# Patient Record
Sex: FEMALE | Race: WHITE | Employment: FULL TIME | URBAN - METROPOLITAN AREA
[De-identification: names, ages, dates, MRNs, and addresses within clinical notes are randomized per-mention and may not be internally consistent; named-entity substitution may affect disease eponyms.]

---

## 2024-08-03 ENCOUNTER — APPOINTMENT (OUTPATIENT)
Dept: GENERAL RADIOLOGY | Age: 34
DRG: 282 | End: 2024-08-03
Payer: COMMERCIAL

## 2024-08-03 ENCOUNTER — HOSPITAL ENCOUNTER (INPATIENT)
Age: 34
LOS: 1 days | Discharge: ANOTHER HOSPITAL | DRG: 282 | End: 2024-08-04
Attending: EMERGENCY MEDICINE | Admitting: FAMILY MEDICINE
Payer: COMMERCIAL

## 2024-08-03 DIAGNOSIS — I21.4 NSTEMI (NON-ST ELEVATED MYOCARDIAL INFARCTION) (HCC): Primary | ICD-10-CM

## 2024-08-03 DIAGNOSIS — R07.9 CHEST PAIN, UNSPECIFIED TYPE: ICD-10-CM

## 2024-08-03 LAB
ALBUMIN SERPL-MCNC: 4.4 G/DL (ref 3.5–5.2)
ALP SERPL-CCNC: 120 U/L (ref 35–104)
ALT SERPL-CCNC: 27 U/L (ref 0–32)
ANION GAP SERPL CALCULATED.3IONS-SCNC: 9 MMOL/L (ref 7–16)
AST SERPL-CCNC: 22 U/L (ref 0–31)
BASOPHILS # BLD: 0.05 K/UL (ref 0–0.2)
BASOPHILS NFR BLD: 1 % (ref 0–2)
BILIRUB SERPL-MCNC: 0.2 MG/DL (ref 0–1.2)
BUN SERPL-MCNC: 16 MG/DL (ref 6–20)
CALCIUM SERPL-MCNC: 9.6 MG/DL (ref 8.6–10.2)
CHLORIDE SERPL-SCNC: 104 MMOL/L (ref 98–107)
CO2 SERPL-SCNC: 26 MMOL/L (ref 22–29)
CREAT SERPL-MCNC: 0.6 MG/DL (ref 0.5–1)
D-DIMER QUANTITATIVE: <200 NG/ML DDU (ref 0–230)
EOSINOPHIL # BLD: 0.2 K/UL (ref 0.05–0.5)
EOSINOPHILS RELATIVE PERCENT: 2 % (ref 0–6)
ERYTHROCYTE [DISTWIDTH] IN BLOOD BY AUTOMATED COUNT: 11.9 % (ref 11.5–15)
GFR, ESTIMATED: >90 ML/MIN/1.73M2
GLUCOSE SERPL-MCNC: 102 MG/DL (ref 74–99)
HCG, URINE, POC: NEGATIVE
HCT VFR BLD AUTO: 42.1 % (ref 34–48)
HGB BLD-MCNC: 14.8 G/DL (ref 11.5–15.5)
IMM GRANULOCYTES # BLD AUTO: 0.03 K/UL (ref 0–0.58)
IMM GRANULOCYTES NFR BLD: 0 % (ref 0–5)
LYMPHOCYTES NFR BLD: 2.26 K/UL (ref 1.5–4)
LYMPHOCYTES RELATIVE PERCENT: 21 % (ref 20–42)
Lab: NORMAL
MCH RBC QN AUTO: 32.4 PG (ref 26–35)
MCHC RBC AUTO-ENTMCNC: 35.2 G/DL (ref 32–34.5)
MCV RBC AUTO: 92.1 FL (ref 80–99.9)
MONOCYTES NFR BLD: 0.7 K/UL (ref 0.1–0.95)
MONOCYTES NFR BLD: 7 % (ref 2–12)
NEGATIVE QC PASS/FAIL: NORMAL
NEUTROPHILS NFR BLD: 70 % (ref 43–80)
NEUTS SEG NFR BLD: 7.44 K/UL (ref 1.8–7.3)
PLATELET # BLD AUTO: 242 K/UL (ref 130–450)
PMV BLD AUTO: 10.1 FL (ref 7–12)
POSITIVE QC PASS/FAIL: NORMAL
POTASSIUM SERPL-SCNC: 4.6 MMOL/L (ref 3.5–5)
PROT SERPL-MCNC: 7.7 G/DL (ref 6.4–8.3)
RBC # BLD AUTO: 4.57 M/UL (ref 3.5–5.5)
SODIUM SERPL-SCNC: 139 MMOL/L (ref 132–146)
TROPONIN I SERPL HS-MCNC: 103 NG/L (ref 0–9)
TROPONIN I SERPL HS-MCNC: 44 NG/L (ref 0–9)
WBC OTHER # BLD: 10.7 K/UL (ref 4.5–11.5)

## 2024-08-03 PROCEDURE — 85379 FIBRIN DEGRADATION QUANT: CPT

## 2024-08-03 PROCEDURE — 71046 X-RAY EXAM CHEST 2 VIEWS: CPT

## 2024-08-03 PROCEDURE — 85025 COMPLETE CBC W/AUTO DIFF WBC: CPT

## 2024-08-03 PROCEDURE — 6360000002 HC RX W HCPCS: Performed by: EMERGENCY MEDICINE

## 2024-08-03 PROCEDURE — 96374 THER/PROPH/DIAG INJ IV PUSH: CPT

## 2024-08-03 PROCEDURE — 99285 EMERGENCY DEPT VISIT HI MDM: CPT

## 2024-08-03 PROCEDURE — 85730 THROMBOPLASTIN TIME PARTIAL: CPT

## 2024-08-03 PROCEDURE — 80053 COMPREHEN METABOLIC PANEL: CPT

## 2024-08-03 PROCEDURE — 6370000000 HC RX 637 (ALT 250 FOR IP): Performed by: EMERGENCY MEDICINE

## 2024-08-03 PROCEDURE — 84484 ASSAY OF TROPONIN QUANT: CPT

## 2024-08-03 PROCEDURE — 93005 ELECTROCARDIOGRAM TRACING: CPT

## 2024-08-03 RX ORDER — HEPARIN SODIUM 10000 [USP'U]/100ML
5-30 INJECTION, SOLUTION INTRAVENOUS CONTINUOUS
Status: DISCONTINUED | OUTPATIENT
Start: 2024-08-03 | End: 2024-08-05 | Stop reason: HOSPADM

## 2024-08-03 RX ORDER — ASPIRIN 81 MG/1
324 TABLET, CHEWABLE ORAL ONCE
Status: COMPLETED | OUTPATIENT
Start: 2024-08-03 | End: 2024-08-03

## 2024-08-03 RX ORDER — HEPARIN SODIUM 1000 [USP'U]/ML
4000 INJECTION, SOLUTION INTRAVENOUS; SUBCUTANEOUS ONCE
Status: COMPLETED | OUTPATIENT
Start: 2024-08-03 | End: 2024-08-03

## 2024-08-03 RX ORDER — HEPARIN SODIUM 1000 [USP'U]/ML
2000 INJECTION, SOLUTION INTRAVENOUS; SUBCUTANEOUS PRN
Status: DISCONTINUED | OUTPATIENT
Start: 2024-08-03 | End: 2024-08-05 | Stop reason: HOSPADM

## 2024-08-03 RX ORDER — HEPARIN SODIUM 1000 [USP'U]/ML
4000 INJECTION, SOLUTION INTRAVENOUS; SUBCUTANEOUS PRN
Status: DISCONTINUED | OUTPATIENT
Start: 2024-08-03 | End: 2024-08-05 | Stop reason: HOSPADM

## 2024-08-03 RX ADMIN — HEPARIN SODIUM 4000 UNITS: 1000 INJECTION INTRAVENOUS; SUBCUTANEOUS at 23:48

## 2024-08-03 RX ADMIN — HEPARIN SODIUM AND DEXTROSE 12 UNITS/KG/HR: 10000; 5 INJECTION INTRAVENOUS at 23:49

## 2024-08-03 RX ADMIN — ASPIRIN 81 MG CHEWABLE TABLET 324 MG: 81 TABLET CHEWABLE at 22:31

## 2024-08-04 ENCOUNTER — APPOINTMENT (OUTPATIENT)
Dept: CT IMAGING | Age: 34
DRG: 282 | End: 2024-08-04
Payer: COMMERCIAL

## 2024-08-04 VITALS
WEIGHT: 195.6 LBS | OXYGEN SATURATION: 97 % | SYSTOLIC BLOOD PRESSURE: 145 MMHG | BODY MASS INDEX: 33.39 KG/M2 | DIASTOLIC BLOOD PRESSURE: 95 MMHG | TEMPERATURE: 98.1 F | HEART RATE: 80 BPM | RESPIRATION RATE: 18 BRPM | HEIGHT: 64 IN

## 2024-08-04 PROBLEM — F32.A DEPRESSION: Status: ACTIVE | Noted: 2024-08-04

## 2024-08-04 PROBLEM — I21.4 NSTEMI (NON-ST ELEVATED MYOCARDIAL INFARCTION) (HCC): Status: ACTIVE | Noted: 2024-08-04

## 2024-08-04 PROBLEM — T43.625A AMPHETAMINE ADVERSE REACTION: Status: ACTIVE | Noted: 2024-08-04

## 2024-08-04 PROBLEM — G43.901 MIGRAINE WITH STATUS MIGRAINOSUS, NOT INTRACTABLE: Status: ACTIVE | Noted: 2024-08-04

## 2024-08-04 PROBLEM — J45.20 MILD INTERMITTENT ASTHMA WITHOUT COMPLICATION: Status: ACTIVE | Noted: 2024-08-04

## 2024-08-04 PROBLEM — F90.9 ADHD: Status: ACTIVE | Noted: 2024-08-04

## 2024-08-04 PROBLEM — F15.90 AMPHETAMINE USE: Status: ACTIVE | Noted: 2024-08-04

## 2024-08-04 LAB
AMPHET UR QL SCN: POSITIVE
BARBITURATES UR QL SCN: NEGATIVE
BENZODIAZ UR QL: NEGATIVE
BUPRENORPHINE UR QL: NEGATIVE
CANNABINOIDS UR QL SCN: NEGATIVE
CHOLEST SERPL-MCNC: 192 MG/DL
COCAINE UR QL SCN: NEGATIVE
EKG ATRIAL RATE: 70 BPM
EKG P AXIS: 26 DEGREES
EKG P-R INTERVAL: 138 MS
EKG Q-T INTERVAL: 374 MS
EKG QRS DURATION: 68 MS
EKG QTC CALCULATION (BAZETT): 403 MS
EKG R AXIS: 8 DEGREES
EKG T AXIS: 51 DEGREES
EKG VENTRICULAR RATE: 70 BPM
FENTANYL UR QL: NEGATIVE
HDLC SERPL-MCNC: 56 MG/DL
LDLC SERPL CALC-MCNC: 99 MG/DL
METHADONE UR QL: NEGATIVE
OPIATES UR QL SCN: NEGATIVE
OXYCODONE UR QL SCN: NEGATIVE
PARTIAL THROMBOPLASTIN TIME: 32 SEC (ref 24.5–35.1)
PARTIAL THROMBOPLASTIN TIME: 61.2 SEC (ref 24.5–35.1)
PARTIAL THROMBOPLASTIN TIME: 72.8 SEC (ref 24.5–35.1)
PARTIAL THROMBOPLASTIN TIME: 84.4 SEC (ref 24.5–35.1)
PCP UR QL SCN: NEGATIVE
TEST INFORMATION: ABNORMAL
TRIGL SERPL-MCNC: 183 MG/DL
TROPONIN I SERPL HS-MCNC: 220 NG/L (ref 0–9)
TROPONIN I SERPL HS-MCNC: 428 NG/L (ref 0–9)
TROPONIN I SERPL HS-MCNC: 995 NG/L (ref 0–9)
TSH SERPL DL<=0.05 MIU/L-ACNC: 1.69 UIU/ML (ref 0.27–4.2)
VLDLC SERPL CALC-MCNC: 37 MG/DL

## 2024-08-04 PROCEDURE — 99222 1ST HOSP IP/OBS MODERATE 55: CPT | Performed by: FAMILY MEDICINE

## 2024-08-04 PROCEDURE — 80061 LIPID PANEL: CPT

## 2024-08-04 PROCEDURE — 6360000004 HC RX CONTRAST MEDICATION: Performed by: RADIOLOGY

## 2024-08-04 PROCEDURE — 2060000000 HC ICU INTERMEDIATE R&B

## 2024-08-04 PROCEDURE — 99223 1ST HOSP IP/OBS HIGH 75: CPT | Performed by: INTERNAL MEDICINE

## 2024-08-04 PROCEDURE — 84443 ASSAY THYROID STIM HORMONE: CPT

## 2024-08-04 PROCEDURE — 71275 CT ANGIOGRAPHY CHEST: CPT

## 2024-08-04 PROCEDURE — 6370000000 HC RX 637 (ALT 250 FOR IP): Performed by: FAMILY MEDICINE

## 2024-08-04 PROCEDURE — 6360000002 HC RX W HCPCS: Performed by: EMERGENCY MEDICINE

## 2024-08-04 PROCEDURE — 93005 ELECTROCARDIOGRAM TRACING: CPT | Performed by: NURSE PRACTITIONER

## 2024-08-04 PROCEDURE — 80307 DRUG TEST PRSMV CHEM ANLYZR: CPT

## 2024-08-04 PROCEDURE — 85730 THROMBOPLASTIN TIME PARTIAL: CPT

## 2024-08-04 PROCEDURE — 2580000003 HC RX 258: Performed by: FAMILY MEDICINE

## 2024-08-04 PROCEDURE — 36415 COLL VENOUS BLD VENIPUNCTURE: CPT

## 2024-08-04 PROCEDURE — 93010 ELECTROCARDIOGRAM REPORT: CPT | Performed by: INTERNAL MEDICINE

## 2024-08-04 PROCEDURE — 84484 ASSAY OF TROPONIN QUANT: CPT

## 2024-08-04 RX ORDER — ALBUTEROL SULFATE 90 UG/1
2 AEROSOL, METERED RESPIRATORY (INHALATION) EVERY 6 HOURS PRN
COMMUNITY

## 2024-08-04 RX ORDER — DEXTROAMPHETAMINE SACCHARATE, AMPHETAMINE ASPARTATE MONOHYDRATE, DEXTROAMPHETAMINE SULFATE AND AMPHETAMINE SULFATE 6.25; 6.25; 6.25; 6.25 MG/1; MG/1; MG/1; MG/1
25 CAPSULE, EXTENDED RELEASE ORAL EVERY MORNING
COMMUNITY
End: 2024-08-04 | Stop reason: HOSPADM

## 2024-08-04 RX ORDER — IBUPROFEN 200 MG
400-800 TABLET ORAL EVERY 6 HOURS PRN
COMMUNITY

## 2024-08-04 RX ORDER — SUMATRIPTAN 25 MG/1
25 TABLET, FILM COATED ORAL
COMMUNITY
End: 2024-08-04 | Stop reason: HOSPADM

## 2024-08-04 RX ORDER — ALBUTEROL SULFATE 0.63 MG/3ML
1 SOLUTION RESPIRATORY (INHALATION) EVERY 6 HOURS PRN
COMMUNITY
End: 2024-08-04 | Stop reason: ALTCHOICE

## 2024-08-04 RX ORDER — DEXTROAMPHETAMINE SACCHARATE, AMPHETAMINE ASPARTATE MONOHYDRATE, DEXTROAMPHETAMINE SULFATE AND AMPHETAMINE SULFATE 6.25; 6.25; 6.25; 6.25 MG/1; MG/1; MG/1; MG/1
25 CAPSULE, EXTENDED RELEASE ORAL EVERY MORNING
COMMUNITY

## 2024-08-04 RX ORDER — ASPIRIN 81 MG/1
81 TABLET, CHEWABLE ORAL DAILY
Status: DISCONTINUED | OUTPATIENT
Start: 2024-08-05 | End: 2024-08-05 | Stop reason: HOSPADM

## 2024-08-04 RX ORDER — ACETAMINOPHEN 325 MG/1
650 TABLET ORAL EVERY 6 HOURS PRN
Status: DISCONTINUED | OUTPATIENT
Start: 2024-08-04 | End: 2024-08-05 | Stop reason: HOSPADM

## 2024-08-04 RX ORDER — ACETAMINOPHEN 650 MG/1
650 SUPPOSITORY RECTAL EVERY 6 HOURS PRN
Status: DISCONTINUED | OUTPATIENT
Start: 2024-08-04 | End: 2024-08-05 | Stop reason: HOSPADM

## 2024-08-04 RX ORDER — SUMATRIPTAN 25 MG/1
25 TABLET, FILM COATED ORAL
COMMUNITY

## 2024-08-04 RX ORDER — M-VIT,TX,IRON,MINS/CALC/FOLIC 27MG-0.4MG
1 TABLET ORAL DAILY
COMMUNITY

## 2024-08-04 RX ORDER — NITROGLYCERIN 0.4 MG/1
0.4 TABLET SUBLINGUAL EVERY 5 MIN PRN
Status: DISCONTINUED | OUTPATIENT
Start: 2024-08-04 | End: 2024-08-05 | Stop reason: HOSPADM

## 2024-08-04 RX ORDER — SODIUM CHLORIDE 0.9 % (FLUSH) 0.9 %
5-40 SYRINGE (ML) INJECTION EVERY 12 HOURS SCHEDULED
Status: DISCONTINUED | OUTPATIENT
Start: 2024-08-04 | End: 2024-08-05 | Stop reason: HOSPADM

## 2024-08-04 RX ORDER — ESCITALOPRAM OXALATE 5 MG/1
5 TABLET ORAL DAILY
COMMUNITY

## 2024-08-04 RX ORDER — ATORVASTATIN CALCIUM 40 MG/1
80 TABLET, FILM COATED ORAL NIGHTLY
Status: DISCONTINUED | OUTPATIENT
Start: 2024-08-04 | End: 2024-08-05 | Stop reason: HOSPADM

## 2024-08-04 RX ORDER — PROPRANOLOL HYDROCHLORIDE 20 MG/1
20 TABLET ORAL 2 TIMES DAILY
COMMUNITY

## 2024-08-04 RX ORDER — ALBUTEROL SULFATE 2.5 MG/3ML
2.5 SOLUTION RESPIRATORY (INHALATION) EVERY 4 HOURS PRN
Status: DISCONTINUED | OUTPATIENT
Start: 2024-08-04 | End: 2024-08-05 | Stop reason: HOSPADM

## 2024-08-04 RX ORDER — SODIUM CHLORIDE 0.9 % (FLUSH) 0.9 %
5-40 SYRINGE (ML) INJECTION PRN
Status: DISCONTINUED | OUTPATIENT
Start: 2024-08-04 | End: 2024-08-05 | Stop reason: HOSPADM

## 2024-08-04 RX ORDER — SODIUM CHLORIDE 9 MG/ML
INJECTION, SOLUTION INTRAVENOUS PRN
Status: DISCONTINUED | OUTPATIENT
Start: 2024-08-04 | End: 2024-08-05 | Stop reason: HOSPADM

## 2024-08-04 RX ADMIN — HEPARIN SODIUM 2000 UNITS: 1000 INJECTION INTRAVENOUS; SUBCUTANEOUS at 14:01

## 2024-08-04 RX ADMIN — ATORVASTATIN CALCIUM 80 MG: 40 TABLET, FILM COATED ORAL at 20:35

## 2024-08-04 RX ADMIN — IOPAMIDOL 75 ML: 755 INJECTION, SOLUTION INTRAVENOUS at 12:47

## 2024-08-04 RX ADMIN — HEPARIN SODIUM AND DEXTROSE 14 UNITS/KG/HR: 10000; 5 INJECTION INTRAVENOUS at 21:54

## 2024-08-04 RX ADMIN — ATORVASTATIN CALCIUM 80 MG: 40 TABLET, FILM COATED ORAL at 06:36

## 2024-08-04 RX ADMIN — SODIUM CHLORIDE, PRESERVATIVE FREE 10 ML: 5 INJECTION INTRAVENOUS at 20:36

## 2024-08-04 ASSESSMENT — PAIN SCALES - GENERAL: PAINLEVEL_OUTOF10: 0

## 2024-08-04 NOTE — DISCHARGE SUMMARY
[facility]   Discharge Summary    Discharge date and time:  8/4/2024  9:57 AM    Follow-up with:     No follow-up provider specified.    Activity level: As tolerated    Diet: Diet NPO Exceptions are: Sips of Water with Meds    Labs:Per PCP    Condition at discharge: Stable    Dispo: Transfer to [facility]    Patient ID:  Ember Barreto 1990  [id number]    Consults:  IP CONSULT TO CARDIOLOGY  IP CONSULT TO CARDIOLOGY    Procedures: N/A    Hospital Course: Ember Barreto is a 33 y.o. female admitted for NSTEMI. She presented to the ER with chest pain. Initial troponin 44, most recent 995. Initial EKG with no ST elevation, repeat reveals anterior ST changes. CTA negative for acute pulmonary abnormality. She received loading dose ASA, heparin, and high dose statin in ER, followed by continuous titratable heparin infusion and daily ASA and statin. Cardiology was consulted by ER physician. Pt admitted and made NPO with plan for transfer for heart catheterization.     Medical problems addressed during this admission:     Discharge Exam:  Vitals:    08/04/24 0815 08/04/24 0830 08/04/24 0845 08/04/24 0900   BP:  129/88  97/80   Pulse: 61 81 68 (!) 104   Resp: 19 18 18 18   Temp:       SpO2: 95% 96% 98% 98%   Weight:           PHYSICAL EXAM        Imaging:  XR CHEST (2 VW)    Result Date: 8/4/2024  EXAMINATION: TWO XRAY VIEWS OF THE CHEST 8/3/2024 10:42 pm COMPARISON: None. HISTORY: ORDERING SYSTEM PROVIDED HISTORY: Chest Pain TECHNOLOGIST PROVIDED HISTORY: Reason for exam:->Chest Pain FINDINGS: PA and left lateral views of the chest demonstrate satisfactory expansion of the lungs which are clear.  The cardiac silhouette appears unremarkable. There is no evidence of a pneumothorax.  The soft tissues and osseous structures appear normal.     No acute cardiopulmonary disease.       Patient Instructions:      Medication List      You have not been prescribed any medications.

## 2024-08-04 NOTE — CONSULTS
CHIEF COMPLAINT: Chest pain    HISTORY OF PRESENT ILLNESS:      33 y.o. female with a history of ADHD, asthma, migraines presents with chest pain which occurred suddenly at 2015 while watching tv.  Associated SOB, felt like something was sitting on her chest.  Has never had anything like this before.  Did have the \"worst headache of my life\" a couple of days ago with blurry vision which resolved with sumatriptan.  Denies substance use.  Workup in ED significant for troponin 44, 103, 220.      Levels as high as 990.  Pain lasted for an hour and a half.  Worse with breathing.  Since then, she has residual discomfort.  But feeling better overall.  No prior significant cardiac history.  No real family history of coronary artery disease.    D-dimer was negative.  Blood pressures have been stable since arrival.  Initiated on heparin.  Started on aspirin and Lipitor as well.  I was consulted for further recommendations        No past medical history on file.    Patient Active Problem List   Diagnosis    NSTEMI (non-ST elevated myocardial infarction) (HCC)    Mild intermittent asthma without complication    ADHD    Depression       No Known Allergies    Current Facility-Administered Medications   Medication Dose Route Frequency Provider Last Rate Last Admin    sodium chloride flush 0.9 % injection 5-40 mL  5-40 mL IntraVENous 2 times per day Sherry Mata H, DO        sodium chloride flush 0.9 % injection 5-40 mL  5-40 mL IntraVENous PRN Sherry Mata, DO        0.9 % sodium chloride infusion   IntraVENous PRN Sherry Mata, DO        acetaminophen (TYLENOL) tablet 650 mg  650 mg Oral Q6H PRN Sherry Mata DO        Or    acetaminophen (TYLENOL) suppository 650 mg  650 mg Rectal Q6H PRN Sherry Mata, DO        [START ON 8/5/2024] aspirin chewable tablet 81 mg  81 mg Oral Daily Sherry Mata DO        atorvastatin (LIPITOR) tablet 80 mg  80 mg Oral Nightly Sherry Mata DO   80 mg at

## 2024-08-04 NOTE — ED PROVIDER NOTES
Abdomen soft, nontender. Bowel sounds normal. Supportive care. Aspirin. IV heparin. Emergent cardiology consult. Admit for further evaluation and treatment.    EKG:  Normal sinus rhythm with ventricular rate of 88 and PACs.  OR interval, QRS duration and QT interval within normal range. Normal axis. No ST segment abnormalities to suggest acute ischemia.    EKG:  Normal sinus rhythm with ventricular rate of 70.  OR interval, QRS duration and QT interval within normal range. Normal axis. No ST segment abnormalities to suggest acute ischemia.    Please note that the withdrawal or failure to initiate urgent interventions for this patient would likely result in a life threatening deterioration or permanent disability.      Accordingly this patient received 45 minutes of critical care time, excluding separately billable procedures.       Myron Arnold,   08/07/24 3077

## 2024-08-04 NOTE — ED NOTES
Hospitalist called to inform trop of 995. No new orders at this time and request to call cardiologist. Dr. Lissette blackwellserved to inform of that trop.

## 2024-08-04 NOTE — PROGRESS NOTES
[facility] Hospitalist Progress Note    Admitting Date and Time: 8/3/2024  9:32 PM  Admit Dx: NSTEMI (non-ST elevated myocardial infarction) (HCC) [I21.4]      Subjective:    8/4: Pt admitted after midnight. Pt awake, A&O, sitting up in bed in no apparent distress. Significant other is at the bedside. Pt denies any active chest pain at present or shortness of breath. She does report she feels a non-radiating tightness in her chest when she takes a deep breath. She reports last week she had to take Sumatriptan 25mg two days in a row for a migraine, most recently 2 days prior to arrival. She denies any needs at this time.     PMH:  has no past medical history on file.    ROS: Denies fever, chills, sob, n/v, HA unless stated above.     Objective:    BP 97/80   Pulse (!) 104   Temp 98.2 °F (36.8 °C)   Resp 18   Wt 81.6 kg (180 lb)   LMP 07/30/2024 (Approximate)   SpO2 98%     PHYSICAL EXAM  Constitutional: No fever, chills, diaphoresis  Skin: No visible skin rash or erythema, no visible skin breakdown  HEENT: Unremarkable; mucous membranes are moist  Neck: No JVD, lymphadenopathy, thyromegaly  Cardiovascular: Normal rate, normal rhythm to auscultation. S1, S2 normal.  No S3 or rubs appreciated. No carotid bruits appreciated.  Respiratory: Clear to auscultation bilaterally. Currently on room air.  Gastrointestinal: Soft, non-tender, non-distended. Active bowel sounds  Genitourinary: No CVA tenderness  Extremities: No clubbing or cyanosis. No edema  Neurological: Awake, alert, oriented x 3.  No evidence of focal motor or sensory deficits  Psychological: Appropriate affect.     Recent Labs     08/03/24  2149      K 4.6      CO2 26   BUN 16   CREATININE 0.6   GLUCOSE 102*   CALCIUM 9.6     No results for input(s): \"MG\" in the last 72 hours.  No results for input(s): \"PHOS\" in the last 72 hours.  Recent Labs     08/03/24  2149   WBC 10.7   RBC 4.57   HGB 14.8   HCT 42.1   MCV 92.1   MCH 32.4

## 2024-08-04 NOTE — H&P
[facility]   History and Physical      CHIEF COMPLAINT:  chest pain    History of Present Illness:  33 y.o. female with a history of ADHD, asthma, migraines presents with chest pain which occurred suddenly at 2015 while watching tv.  Associated SOB, felt like something was sitting on her chest.  Has never had anything like this before.  Did have the \"worst headache of my life\" a couple of days ago with blurry vision which resolved with sumatriptan.  Denies substance use.  Workup in ED significant for troponin 44, 103, 220.  Cardiology c/s from ED.    Informant(s) for H&P: patient, chart    REVIEW OF SYSTEMS:  no fevers, chills, n/v, ha, vision/hearing changes, wt changes, hot/cold flashes, other open skin lesions, diarrhea, constipation, dysuria/hematuria unless noted in HPI. Complete ROS performed with the patient and is otherwise negative.      PMH:  No past medical history on file.    Surgical History:  No past surgical history on file.    Medications Prior to Admission:    Prior to Admission medications    Not on File       Allergies:    Patient has no known allergies.    Social History:          Family History:   family history is not on file.     PHYSICAL EXAM:  Vitals:  /85   Pulse 73   Temp 98.2 °F (36.8 °C)   Resp 18   Wt 81.6 kg (180 lb)   LMP 07/30/2024 (Approximate)   SpO2 98%     Constitutional:  NAD, awake, alert  Eyes: no scleral icterus, normal lids, no discharge  ENMT:  Normocephalic, atraumatic, mucosa moist, EOMI  Neck:  trachea midline, no JVD  Lungs:  CTA bilaterally, no audible rhonchi or wheezes noted, respirations unlabored, no retractions  Heart::  RRR, no murmur, rub, or gallop noted during exam  Abd:  Soft, non tender, non distended, bowel sounds present  :  deferred  MSK: sarcopenia absent  Ext:  Moving all extremities, no edema, pulses present  Skin:  Warm and dry, no rashes on visible skin  Psych: non-anxious affect  Neuro:  PERRL,

## 2024-08-04 NOTE — CARE COORDINATION
After conversation with cardiologist, placed call to radiology requesting supplemental information to pulm CTA-specifically requesting that radiologist comment on any findings related to coronary arteries (dissection). Awaiting addendum to results and/or return call from radiologist.  If no indication of coronary artery dissection, will initiate transfer to [facility]. See cardiologist note for additional details.  1700 update: Received callback from radiologist Dr. Tiago Donaldson. Coronary arteries cannot be fully evaluated with pulm CTA, but there is no obvious dissection. He notes that there is contrast within the coronary arteries and origins are patent. Provided this update to cardiologist, who confirmed that pt should now have a heart cath.   Called [facility] Access line to initiate transfer to [facility].

## 2024-08-05 LAB
EKG ATRIAL RATE: 77 BPM
EKG ATRIAL RATE: 88 BPM
EKG P AXIS: 10 DEGREES
EKG P AXIS: 70 DEGREES
EKG P-R INTERVAL: 114 MS
EKG P-R INTERVAL: 126 MS
EKG Q-T INTERVAL: 342 MS
EKG Q-T INTERVAL: 370 MS
EKG QRS DURATION: 68 MS
EKG QRS DURATION: 74 MS
EKG QTC CALCULATION (BAZETT): 413 MS
EKG QTC CALCULATION (BAZETT): 418 MS
EKG R AXIS: -11 DEGREES
EKG R AXIS: 16 DEGREES
EKG T AXIS: 45 DEGREES
EKG T AXIS: 68 DEGREES
EKG VENTRICULAR RATE: 77 BPM
EKG VENTRICULAR RATE: 88 BPM

## 2024-08-05 PROCEDURE — 93010 ELECTROCARDIOGRAM REPORT: CPT | Performed by: INTERNAL MEDICINE

## 2024-08-05 NOTE — PROGRESS NOTES
4 Eyes Skin Assessment     NAME:  Ember Barreto  YOB: 1990  MEDICAL RECORD NUMBER:  [id number]    The patient is being assessed for  Admission    I agree that at least one RN has performed a thorough Head to Toe Skin Assessment on the patient. ALL assessment sites listed below have been assessed.      Areas assessed by both nurses:    Head, Face, Ears, Shoulders, Back, Chest, Arms, Elbows, Hands, Sacrum. Buttock, Coccyx, Ischium, and Legs. Feet and Heels        Does the Patient have a Wound? No noted wound(s)       Silas Prevention initiated by RN: No  Wound Care Orders initiated by RN: No    Pressure Injury (Stage 3,4, Unstageable, DTI, NWPT, and Complex wounds) if present, place Wound referral order by RN under : No    New Ostomies, if present place, Ostomy referral order under : No     Nurse 1 eSignature: Electronically signed by Wesly Sebastian RN on 8/4/24 at 9:58 PM EDT    **SHARE this note so that the co-signing nurse can place an eSignature**    Nurse 2 eSignature: Electronically signed by Yanique Antony RN on 8/4/24 at 10:47 PM EDT

## 2024-08-05 NOTE — PROGRESS NOTES
Patient has a bed ready at [facility]. [facility] to pick her up in 60 min. Dr Mata, patient, and patient's  aware. Attempted to call nurse to nurse, however, number provided is no longer available.